# Patient Record
Sex: MALE
[De-identification: names, ages, dates, MRNs, and addresses within clinical notes are randomized per-mention and may not be internally consistent; named-entity substitution may affect disease eponyms.]

---

## 2019-01-15 ENCOUNTER — HOSPITAL ENCOUNTER (EMERGENCY)
Dept: HOSPITAL 14 - H.ER | Age: 49
Discharge: HOME | End: 2019-01-15
Payer: COMMERCIAL

## 2019-01-15 VITALS
SYSTOLIC BLOOD PRESSURE: 132 MMHG | RESPIRATION RATE: 16 BRPM | DIASTOLIC BLOOD PRESSURE: 88 MMHG | OXYGEN SATURATION: 100 % | TEMPERATURE: 98.1 F | HEART RATE: 71 BPM

## 2019-01-15 DIAGNOSIS — S40.011A: Primary | ICD-10-CM
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
## 2019-01-15 NOTE — ED PDOC
Upper Extremity Pain/Injury


Time Seen by Provider: 01/15/19 10:11


Chief Complaint (Nursing): Upper Extremity Problem/Injury


Chief Complaint (Provider): R shoulder pain


History Per: Patient


Additional Complaint(s): 





Pt reports R shoulder pain after trip and fall 3 days ago, took Ibuprofen at 

home.  Denies head trauma.





Past Medical History


Reviewed: Nursing Documentation, Vital Signs


Vital Signs: 





                                Last Vital Signs











Temp  98.1 F   01/15/19 09:35


 


Pulse  71   01/15/19 09:35


 


Resp  16   01/15/19 09:35


 


BP  132/88   01/15/19 09:35


 


Pulse Ox  100   01/15/19 09:35














- Medical History


PMH: No Chronic Diseases





- Family History


Family History: States: Unknown Family Hx





- Social History


Current smoker - smoking cessation education provided: No


Alcohol: None





- Home Medications


Home Medications: 


                                Ambulatory Orders











 Medication  Instructions  Recorded


 


Naproxen [Naprosyn] 500 mg PO BID PRN #15 tablet 01/15/19














- Allergies


Allergies/Adverse Reactions: 


                                    Allergies











Allergy/AdvReac Type Severity Reaction Status Date / Time


 


No Known Allergies Allergy   Verified 01/15/19 09:35














Review of Systems


Musculoskeletal: Positive for: Shoulder Pain.  Negative for: Arm Pain





Physical Exam





- Reviewed


Nursing Documentation Reviewed: Yes


Vital Signs Reviewed: Yes





- Physical Exam


Appears: Positive for: Well, No Acute Distress


Head Exam: Positive for: ATRAUMATIC, NORMAL INSPECTION


Skin: Positive for: Normal Color, Warm, Dry


Eye Exam: Positive for: Normal appearance, EOMI, PERRL


Extremity: Positive for: Tenderness (R shoulder), Capillary Refill (<2 sec), 

Other (No deformity).  Negative for: Normal ROM, Deformity, Swelling


Neurologic/Psych: Positive for: Alert, Oriented





- ECG


O2 Sat by Pulse Oximetry: 100





Medical Decision Making


Medical Decision Makin yo male with R shoulder pain s/p fall.


- XR R shoulder


- Percocet





Accession No. : [id number]


Patient Name / ID : ESTELA SHI  / [id number]


Exam Date : 01/15/2019 10:27:19 ( Approved )


Study Comment : 


Sex / Age : M  / [id number]





Creator : Alex Amezcua MD


Dictator : Alex Amezcua MD


 : 


Approver : Alex Amezcua MD


Approver2 : 





Report Date : 01/15/2019 11:31:59


My Comment : 


 

********************************************************************************


***





Date of service: 





01/15/2019





PROCEDURE:  Radiographs of the Right Shoulder





HISTORY:


Fall











COMPARISON:


No prior.





FINDINGS:





BONES:


Normal. No fracture.





JOINTS:


Normal. Glenohumeral and acromioclavicular joints preserved. No osteoarthritis.





SOFT TISSUES:


Normal.





OTHER FINDINGS:


None. 





IMPRESSION:


Normal radiographs of the right shoulder.





Disposition





- Clinical Impression


Clinical Impression: 


 Shoulder contusion








- Patient ED Disposition


Is Patient to be Admitted: No





- Disposition


Referrals: 


[facility] [place] [Outside]


Disposition: Routine/Home


Disposition Time: 12:08


Condition: STABLE


Prescriptions: 


Naproxen [Naprosyn] 500 mg PO BID PRN #15 tablet


 PRN Reason: Pain, Moderate (4-7)


Instructions:  Contusion (DC), Shoulder Pain (DC)


Forms:  RF nano (Bangladeshi)


Print Language: Faroese

## 2019-01-15 NOTE — RAD
Date of service: 



01/15/2019



PROCEDURE:  Radiographs of the Right Shoulder



HISTORY:

Fall







COMPARISON:

No prior.



FINDINGS:



BONES:

Normal. No fracture.



JOINTS:

Normal. Glenohumeral and acromioclavicular joints preserved. No 

osteoarthritis.



SOFT TISSUES:

Normal.



OTHER FINDINGS:

None. 



IMPRESSION:

Normal radiographs of the right shoulder.